# Patient Record
(demographics unavailable — no encounter records)

---

## 2024-12-04 NOTE — CARDIOLOGY SUMMARY
[de-identified] : 12/04/24 - sinus rhythm, incomplete RBBB, LAFB, old anterior infarct, old inferior infarct [de-identified] : 08/07/24 (exercise ECG) - 11.2 METS, 96% MPHR, 09:44 min, no ischemic ST segment changes, Duke score low risk 03/21/22 (exercise ECG) - 9 METS, 88% MPHR, 09:13 min, no ECG abnormalities, Duke score 9 11/21/16 (exercise tetrofosmin) - 12 METS, 91% MPHR, 12:24 min, small to medium, severe defects in apical. distal anterior and anteroseptal walls that are fixed, suggestive of infarct, no clear evidence of ischemia, LVEF 43% [de-identified] : 08/07/24 - MAC, mild-mod MR, normal LA, moderate segmental LV systolic dysfunction, normal RV size and function, LVEF 35-40% 03/18/22 - mild-mod MR, normal LA, mild segmental LV systolic dysfunction, no LV thrombus, normal RV size and function, RVSP 31 mmHg, LVEF 45% 11/21/16 - MAC, mild MR, normal LA, grossly moderate LV systolic dysfunction, no LV thrombus, mild diastolic dysfunction, LVEF 41% 08/20/08 - mid to apical anterior, anteroseptal and anteroapical akinesis, LVEF 40% [de-identified] : 05/15/06 (PCI) - TAXUS stents to mid LAD/ostial D1 for treatment of late anterior wall STEMI [de-identified] : 06/17/08 (CABG) - LIMA-LAD, SVG-D1 for treatment of NSTEMI and LAD in-stent restenosis

## 2024-12-04 NOTE — DISCUSSION/SUMMARY
[Coronary Artery Disease] : coronary artery disease [Congestive Heart Failure] : congestive heart failure [Compensated] : compensated [Hypertension] : hypertension [Stable] : stable [FreeTextEntry1] : Currently stable from a cardiovascular standpoint. Normotensive. Chronic systolic heart failure secondary to ischemic cardiomyopathy (LVEF 35-40%). Currently euvolemic. Stable CAD (s/p MI, s/p CABG x2 vessels). Asymptomatic. Continue current medications including aspirin, metoprolol, ramipril, and atorvastatin. ECG completed today and reviewed (findings as noted above). Follow up in 6 months. [EKG obtained to assist in diagnosis and management of assessed problem(s)] : EKG obtained to assist in diagnosis and management of assessed problem(s)

## 2025-06-04 NOTE — DISCUSSION/SUMMARY
[Coronary Artery Disease] : coronary artery disease [Congestive Heart Failure] : congestive heart failure [Compensated] : compensated [Hypertension] : hypertension [Stable] : stable [FreeTextEntry1] : Currently stable from a cardiovascular standpoint. Normotensive. Chronic systolic heart failure secondary to ischemic cardiomyopathy (LVEF 35-40%). Currently euvolemic. Stable CAD (s/p MI, s/p CABG x2 vessels). Asymptomatic. Continue current medications including aspirin, metoprolol, ramipril, and atorvastatin. ECG completed today and reviewed (findings as noted above). Follow up in 6 months.

## 2025-06-04 NOTE — CARDIOLOGY SUMMARY
[de-identified] : 12/04/24 - sinus rhythm, incomplete RBBB, LAFB, old anterior infarct, old inferior infarct [de-identified] : 08/07/24 (exercise ECG) - 11.2 METS, 96% MPHR, 09:44 min, no ischemic ST segment changes, Duke score low risk 03/21/22 (exercise ECG) - 9 METS, 88% MPHR, 09:13 min, no ECG abnormalities, Duke score 9 11/21/16 (exercise tetrofosmin) - 12 METS, 91% MPHR, 12:24 min, small to medium, severe defects in apical. distal anterior and anteroseptal walls that are fixed, suggestive of infarct, no clear evidence of ischemia, LVEF 43% [de-identified] : 08/07/24 - MAC, mild-mod MR, normal LA, moderate segmental LV systolic dysfunction, normal RV size and function, LVEF 35-40% 03/18/22 - mild-mod MR, normal LA, mild segmental LV systolic dysfunction, no LV thrombus, normal RV size and function, RVSP 31 mmHg, LVEF 45% 11/21/16 - MAC, mild MR, normal LA, grossly moderate LV systolic dysfunction, no LV thrombus, mild diastolic dysfunction, LVEF 41% 08/20/08 - mid to apical anterior, anteroseptal and anteroapical akinesis, LVEF 40% [de-identified] : 05/15/06 (PCI) - TAXUS stents to mid LAD/ostial D1 for treatment of late anterior wall STEMI [de-identified] : 06/17/08 (CABG) - LIMA-LAD, SVG-D1 for treatment of NSTEMI and LAD in-stent restenosis

## 2025-06-04 NOTE — CARDIOLOGY SUMMARY
[de-identified] : 12/04/24 - sinus rhythm, incomplete RBBB, LAFB, old anterior infarct, old inferior infarct [de-identified] : 08/07/24 (exercise ECG) - 11.2 METS, 96% MPHR, 09:44 min, no ischemic ST segment changes, Duke score low risk 03/21/22 (exercise ECG) - 9 METS, 88% MPHR, 09:13 min, no ECG abnormalities, Duke score 9 11/21/16 (exercise tetrofosmin) - 12 METS, 91% MPHR, 12:24 min, small to medium, severe defects in apical. distal anterior and anteroseptal walls that are fixed, suggestive of infarct, no clear evidence of ischemia, LVEF 43% [de-identified] : 08/07/24 - MAC, mild-mod MR, normal LA, moderate segmental LV systolic dysfunction, normal RV size and function, LVEF 35-40% 03/18/22 - mild-mod MR, normal LA, mild segmental LV systolic dysfunction, no LV thrombus, normal RV size and function, RVSP 31 mmHg, LVEF 45% 11/21/16 - MAC, mild MR, normal LA, grossly moderate LV systolic dysfunction, no LV thrombus, mild diastolic dysfunction, LVEF 41% 08/20/08 - mid to apical anterior, anteroseptal and anteroapical akinesis, LVEF 40% [de-identified] : 05/15/06 (PCI) - TAXUS stents to mid LAD/ostial D1 for treatment of late anterior wall STEMI [de-identified] : 06/17/08 (CABG) - LIMA-LAD, SVG-D1 for treatment of NSTEMI and LAD in-stent restenosis